# Patient Record
Sex: MALE | Race: WHITE
[De-identification: names, ages, dates, MRNs, and addresses within clinical notes are randomized per-mention and may not be internally consistent; named-entity substitution may affect disease eponyms.]

---

## 2017-02-07 ENCOUNTER — TRANSCRIPTION ENCOUNTER (OUTPATIENT)
Age: 57
End: 2017-02-07

## 2017-03-01 ENCOUNTER — APPOINTMENT (OUTPATIENT)
Dept: ORTHOPEDIC SURGERY | Facility: CLINIC | Age: 57
End: 2017-03-01

## 2017-11-14 ENCOUNTER — RESULT REVIEW (OUTPATIENT)
Age: 57
End: 2017-11-14

## 2019-07-09 ENCOUNTER — APPOINTMENT (OUTPATIENT)
Dept: CARDIOLOGY | Facility: CLINIC | Age: 59
End: 2019-07-09
Payer: COMMERCIAL

## 2019-07-09 ENCOUNTER — NON-APPOINTMENT (OUTPATIENT)
Age: 59
End: 2019-07-09

## 2019-07-09 VITALS
RESPIRATION RATE: 14 BRPM | HEART RATE: 88 BPM | OXYGEN SATURATION: 99 % | BODY MASS INDEX: 29.33 KG/M2 | DIASTOLIC BLOOD PRESSURE: 84 MMHG | WEIGHT: 198 LBS | SYSTOLIC BLOOD PRESSURE: 131 MMHG | HEIGHT: 69 IN

## 2019-07-09 DIAGNOSIS — Z78.9 OTHER SPECIFIED HEALTH STATUS: ICD-10-CM

## 2019-07-09 PROCEDURE — 93000 ELECTROCARDIOGRAM COMPLETE: CPT

## 2019-07-09 PROCEDURE — 99214 OFFICE O/P EST MOD 30 MIN: CPT

## 2019-07-09 NOTE — PHYSICAL EXAM
[General Appearance - Well Developed] : well developed [General Appearance - Well Nourished] : well nourished [Normal Oropharynx] : normal oropharynx [Normal Conjunctiva] : the conjunctiva exhibited no abnormalities [Heart Rate And Rhythm] : heart rate and rhythm were normal [Normal Jugular Venous V Waves Present] : normal jugular venous V waves present [Heart Sounds] : normal S1 and S2 [Murmurs] : no murmurs present [] : no respiratory distress [Respiration, Rhythm And Depth] : normal respiratory rhythm and effort [Auscultation Breath Sounds / Voice Sounds] : lungs were clear to auscultation bilaterally [Bowel Sounds] : normal bowel sounds [Abdomen Soft] : soft [Abdomen Tenderness] : non-tender [Abnormal Walk] : normal gait [Nail Clubbing] : no clubbing of the fingernails [Skin Color & Pigmentation] : normal skin color and pigmentation [Cyanosis, Localized] : no localized cyanosis [Skin Turgor] : normal skin turgor [Affect] : the affect was normal [Oriented To Time, Place, And Person] : oriented to person, place, and time [FreeTextEntry1] : no edema

## 2019-07-09 NOTE — DISCUSSION/SUMMARY
[FreeTextEntry1] : Patient is a 60yo M with HTN here for cardiac evaluation of near syncope. Episodes likely vagal and advised abortive measures. Will arrange cardiac work up as well to include holter, echo and EST. IF all negative, would also cut out HCTZ if episodes continue. Can titrate up ARB if needed. \par \par 1. Echo, holter and EST to evaluate near syncopal episodes\par 2. Continue telmisartan-hct, will consider stopping HCTZ at follow up if work up negative and symptoms continue\par 3. Keep hydrated and discussed abortive measures for vagal episodes\par 4. FOllow up after testing

## 2019-07-09 NOTE — REASON FOR VISIT
[Follow-Up - From Hospitalization] : follow-up of a recent hospitalization for [FreeTextEntry2] : near syncope

## 2019-07-09 NOTE — HISTORY OF PRESENT ILLNESS
[FreeTextEntry1] : Patient is a 60yo M with HTN here for cardiac evaluation of near syncope. Went to [facility] and observed overnight. No events on monitor and ruled out ACS. HAd gone to gym in am, drank water and ate banana. 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 Was standing/talking and felt dizzy/lightheaded. Went to knees and felt better. No CP/SOB, no pnd/orthopnea/palps or true syncope. Continues to get episodes since discharge.  Will feel a rush coming up back of his neck, can last up to an hour. NEeds to sit and relax to abort episodes. HAs happened several times. Has also noted stress at work and with recent diagnosis of cancer in his mother. \par \par ROS: GI negative, all others negative

## 2019-07-09 NOTE — ASSESSMENT
[FreeTextEntry1] : ECG: SR, no significant ST-T abnormalities and normal intervals \par \par  HDL 59  TG 46 (4/2019)

## 2019-07-15 ENCOUNTER — APPOINTMENT (OUTPATIENT)
Dept: CARDIOLOGY | Facility: CLINIC | Age: 59
End: 2019-07-15
Payer: COMMERCIAL

## 2019-07-15 PROCEDURE — 93015 CV STRESS TEST SUPVJ I&R: CPT

## 2019-07-18 ENCOUNTER — APPOINTMENT (OUTPATIENT)
Dept: CARDIOLOGY | Facility: CLINIC | Age: 59
End: 2019-07-18
Payer: COMMERCIAL

## 2019-07-18 PROCEDURE — 93306 TTE W/DOPPLER COMPLETE: CPT

## 2019-07-25 ENCOUNTER — APPOINTMENT (OUTPATIENT)
Dept: CARDIOLOGY | Facility: CLINIC | Age: 59
End: 2019-07-25
Payer: COMMERCIAL

## 2019-07-25 VITALS
HEART RATE: 70 BPM | WEIGHT: 198 LBS | BODY MASS INDEX: 29.33 KG/M2 | HEIGHT: 69 IN | DIASTOLIC BLOOD PRESSURE: 77 MMHG | SYSTOLIC BLOOD PRESSURE: 121 MMHG | RESPIRATION RATE: 16 BRPM | OXYGEN SATURATION: 98 %

## 2019-07-25 DIAGNOSIS — R55 SYNCOPE AND COLLAPSE: ICD-10-CM

## 2019-07-25 PROCEDURE — 99214 OFFICE O/P EST MOD 30 MIN: CPT

## 2019-07-25 RX ORDER — CICLOPIROX OLAMINE 7.7 MG/G
0.77 CREAM TOPICAL
Qty: 30 | Refills: 0 | Status: COMPLETED | COMMUNITY
Start: 2019-02-22

## 2019-07-25 NOTE — DISCUSSION/SUMMARY
[FreeTextEntry1] : Patient is a 58yo M with HTN here for cardiac evaluation of near syncope. Episodes likely vagal and advised abortive measures. EST without ischemia, brief run NSVT and some ectopy but otherwise structurally normal heart on echo. Borderline LVH only, corrects for body size. Holter with rare ectopy only as well. Given brief NSVT will arrange 4 week event monitor but low suspicion will be significant. \par \par 1. 4 week event monitor to evaluate NSVT and PVC , call with results\par 2. Keep hydrated and discussed abortive measures for vagal episodes\par 3. DC HCTZ, continue telmisartan and can titrate up if needed for HTN\par 4. .lifestyle \par 5. Recommend 30 minutes moderate intensity aerobic activity 5 days per week \par 6. CAC for further risk stratification, call with results\par 7. Follow up 6 months

## 2019-07-25 NOTE — HISTORY OF PRESENT ILLNESS
[FreeTextEntry1] : Patient is a 60yo M with HTN here for cardiac evaluation of near syncope. Went to [facility] and observed overnight. No events on monitor and ruled out ACS. HAd gone to gym in am, drank water and ate banana. 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 Was standing/talking and felt dizzy/lightheaded. Went to knees and felt better. No CP/SOB, no pnd/orthopnea/palps or true syncope. Continues to get episodes since discharge.  Will feel a rush coming up back of his neck, can last up to an hour. NEeds to sit and relax to abort episodes. HAs happened several times. Has also noted stress at work and with recent diagnosis of cancer in his mother. FEeling better, one recurrent episode last friday. \par \par Patient underwent cardiac testing after last visit. \par \par ROS: GI and  negative

## 2019-07-25 NOTE — ASSESSMENT
[FreeTextEntry1] : \par \par  HDL 59  TG 46 (4/2019) \par \par HOLTER 7/2019:\par 1. SR\par 2. 3 PVCs and 1 PAC\par 3. No events\par \par ECHO 7/2019:\par Summary:\par 1. Left ventricular ejection fraction, by visual estimation, is 60 to 65%.\par 2. Normal global left ventricular systolic function.\par 3. Equivocal LV diastolic parameters.\par 4. Normal left ventricular internal cavity size.\par 5. Mild concentric left ventricular hypertrophy.\par 6. Prominent septal knuckle noted without LVOT obstruction.\par 7. Normal right ventricular size and systolic function.\par 8. Mildly dilated left atrium.\par 9. The right atrium is normal in size and structure.\par 10. Mild mitral annular calcification.\par 11. Trace mitral valve regurgitation.\par 12. Normal aortic valve, without any evidence of aortic stenosis or insufficiency.\par 13. There is no evidence of pericardial effusion.\par 14. TR jet was inadequate for evaluation of RV/PA pressure.\par 15. Recommend clinical correlation with the above findings.\par \par EST 7/2019:\par 1. Negative exercise stress test for ischemia.\par 2. The patient developed occasional PVCs, ventricular couplets, 3 beat ventricular run and\par occasional PACs during exercise.\par 3. The blood pressure response was normal.\par 4. The patient developed leg fatigue during the stress exam. The symptoms resolved with rest.\par 5. The test was terminated due to leg fatigue.\par 6. The patient's functional capacity was above average.\par 7. The Duke Treadmill Score was 10, consistent with low risk.\par 8. Recommend clinical correlation with the above findings.

## 2019-08-09 ENCOUNTER — APPOINTMENT (OUTPATIENT)
Dept: CARDIOLOGY | Facility: CLINIC | Age: 59
End: 2019-08-09

## 2020-01-08 ENCOUNTER — APPOINTMENT (OUTPATIENT)
Dept: CARDIOLOGY | Facility: CLINIC | Age: 60
End: 2020-01-08
Payer: COMMERCIAL

## 2020-01-08 ENCOUNTER — NON-APPOINTMENT (OUTPATIENT)
Age: 60
End: 2020-01-08

## 2020-01-08 VITALS
DIASTOLIC BLOOD PRESSURE: 85 MMHG | HEART RATE: 79 BPM | RESPIRATION RATE: 16 BRPM | HEIGHT: 69 IN | BODY MASS INDEX: 29.92 KG/M2 | WEIGHT: 202 LBS | SYSTOLIC BLOOD PRESSURE: 135 MMHG

## 2020-01-08 PROCEDURE — 99214 OFFICE O/P EST MOD 30 MIN: CPT

## 2020-01-08 PROCEDURE — 93000 ELECTROCARDIOGRAM COMPLETE: CPT

## 2020-01-08 RX ORDER — TELMISARTAN 40 MG/1
40 TABLET ORAL
Refills: 0 | Status: ACTIVE | COMMUNITY

## 2020-01-08 RX ORDER — TELMISARTAN AND HYDROCHLOROTHIAZIDE 40; 12.5 MG/1; MG/1
40-12.5 TABLET ORAL DAILY
Refills: 0 | Status: DISCONTINUED | COMMUNITY
End: 2020-01-08

## 2020-01-08 NOTE — HISTORY OF PRESENT ILLNESS
[FreeTextEntry1] : Patient is a 60yo M with HTN, CAC here for follow up. As part of work up for near syncope had cardiac work up and ultimately found to have moderate non-obstructive CAD based on CT.  Put on statin. Feeling well without CP/SOB. Patient denies PND/orthopnea/edema/palpitations/syncope/claudication. RAre occasional dizzines but mild. IN mornings mild muscle cramping in neck and shoulders. \par \par \par \par ROS: GI and  negative

## 2020-01-08 NOTE — DISCUSSION/SUMMARY
[FreeTextEntry1] : Patient is a 58yo M with HTN here for cardiac evaluation of near syncope. Episodes likely vagal and advised abortive measures. EST without ischemia, brief run NSVT and some ectopy but otherwise structurally normal heart on echo. Borderline LVH only, corrects for body size. No events on event monitor in the fall. Has moderate non-obstructive CAD with CAC = 133. Med management. \par \par 1. Continue medical management of CAD \par 2. Continue current antihypertensives, monitod bid at home and call with results in 2 weeks. Suspect component white coat HTN given dizziness on higher doses in past\par 3. Recommend aggressive diet and lifestyle modifications \par 4. Recommend 30 minutes moderate intensity aerobic activity 5 days per week \par 5. Trial of CoQ 100mg prior to statin for side effects. ALso needs dietary changes to get numbers down further\par 6. Restart ASA 81mg daily due to moderate CAC\par 7. Followup 6 months

## 2020-01-08 NOTE — ASSESSMENT
[FreeTextEntry1] : ECG: SR, no significant ST-T abnormalities and normal intervals \par \par  HDL 73  TG 64 (1/2020) \par  HDL 59  TG 46 (4/2019) \par \par CAC 9/2019:\par 1. Total calcium score = 133\par 2. LM = 0, LAD = 60, LCx = 73, RCA = 0\par \par Event Monitor 4 week (9/2019)\par 1. SR, no events\par \par HOLTER 7/2019:\par 1. SR\par 2. 3 PVCs and 1 PAC\par 3. No events\par \par ECHO 7/2019:\par Summary:\par 1. Left ventricular ejection fraction, by visual estimation, is 60 to 65%.\par 2. Normal global left ventricular systolic function.\par 3. Equivocal LV diastolic parameters.\par 4. Normal left ventricular internal cavity size.\par 5. Mild concentric left ventricular hypertrophy.\par 6. Prominent septal knuckle noted without LVOT obstruction.\par 7. Normal right ventricular size and systolic function.\par 8. Mildly dilated left atrium.\par 9. The right atrium is normal in size and structure.\par 10. Mild mitral annular calcification.\par 11. Trace mitral valve regurgitation.\par 12. Normal aortic valve, without any evidence of aortic stenosis or insufficiency.\par 13. There is no evidence of pericardial effusion.\par 14. TR jet was inadequate for evaluation of RV/PA pressure.\par 15. Recommend clinical correlation with the above findings.\par \par EST 7/2019:\par 1. Negative exercise stress test for ischemia.\par 2. The patient developed occasional PVCs, ventricular couplets, 3 beat ventricular run and\par occasional PACs during exercise.\par 3. The blood pressure response was normal.\par 4. The patient developed leg fatigue during the stress exam. The symptoms resolved with rest.\par 5. The test was terminated due to leg fatigue.\par 6. The patient's functional capacity was above average.\par 7. The Duke Treadmill Score was 10, consistent with low risk.\par 8. Recommend clinical correlation with the above findings.

## 2020-07-09 ENCOUNTER — APPOINTMENT (OUTPATIENT)
Dept: CARDIOLOGY | Facility: CLINIC | Age: 60
End: 2020-07-09
Payer: COMMERCIAL

## 2020-07-09 VITALS
HEIGHT: 69 IN | HEART RATE: 85 BPM | DIASTOLIC BLOOD PRESSURE: 80 MMHG | WEIGHT: 201 LBS | OXYGEN SATURATION: 98 % | SYSTOLIC BLOOD PRESSURE: 130 MMHG | TEMPERATURE: 98.5 F | RESPIRATION RATE: 16 BRPM | BODY MASS INDEX: 29.77 KG/M2

## 2020-07-09 DIAGNOSIS — Z00.00 ENCOUNTER FOR GENERAL ADULT MEDICAL EXAMINATION W/OUT ABNORMAL FINDINGS: ICD-10-CM

## 2020-07-09 DIAGNOSIS — R42 DIZZINESS AND GIDDINESS: ICD-10-CM

## 2020-07-09 PROCEDURE — 93000 ELECTROCARDIOGRAM COMPLETE: CPT

## 2020-07-09 PROCEDURE — 99214 OFFICE O/P EST MOD 30 MIN: CPT

## 2020-07-09 NOTE — HISTORY OF PRESENT ILLNESS
[FreeTextEntry1] : Patient is a 59yo M with HTN, CAC here for follow up. As part of work up for near syncope had cardiac work up and ultimately found to have moderate non-obstructive CAD based on CT.  Put on statin. Feeling well without CP/SOB. Patient denies PND/orthopnea/edema/palpitations/syncope/claudication.  Had mild symptoms from COVID couple months back, had fatigue and mild chest discomfort now better. \par \par Having intermittent dizziness again. No clear aggravating factors. NOt exertional, can be at rest. NOt necessarily positional. LAsts 10-15minutes at times. REcently past few days with pain in chest, abdomen and arms. One night felt like reflux and up to throat. STarted prilosec and now better past 1-2 days. BP at home running 120s. \par \par \par ROS: GI and  negative

## 2020-07-09 NOTE — PHYSICAL EXAM
[General Appearance - Well Developed] : well developed [General Appearance - Well Nourished] : well nourished [Normal Conjunctiva] : the conjunctiva exhibited no abnormalities [Normal Oropharynx] : normal oropharynx [] : no respiratory distress [Normal Jugular Venous V Waves Present] : normal jugular venous V waves present [Respiration, Rhythm And Depth] : normal respiratory rhythm and effort [Heart Rate And Rhythm] : heart rate and rhythm were normal [Auscultation Breath Sounds / Voice Sounds] : lungs were clear to auscultation bilaterally [Heart Sounds] : normal S1 and S2 [Murmurs] : no murmurs present [Edema] : no peripheral edema present [Bowel Sounds] : normal bowel sounds [Abdomen Soft] : soft [Abdomen Tenderness] : non-tender [Abnormal Walk] : normal gait [Nail Clubbing] : no clubbing of the fingernails [Cyanosis, Localized] : no localized cyanosis [Skin Color & Pigmentation] : normal skin color and pigmentation [Skin Turgor] : normal skin turgor [Oriented To Time, Place, And Person] : oriented to person, place, and time [Affect] : the affect was normal

## 2020-07-09 NOTE — ASSESSMENT
[FreeTextEntry1] : \par \par  HDL 73  TG 64 (1/2020) \par  HDL 59  TG 46 (4/2019) \par \par CAC 9/2019:\par 1. Total calcium score = 133\par 2. LM = 0, LAD = 60, LCx = 73, RCA = 0\par \par Event Monitor 4 week (9/2019)\par 1. SR, no events\par \par HOLTER 7/2019:\par 1. SR\par 2. 3 PVCs and 1 PAC\par 3. No events\par \par ECHO 7/2019:\par Summary:\par 1. Left ventricular ejection fraction, by visual estimation, is 60 to 65%.\par 2. Normal global left ventricular systolic function.\par 3. Equivocal LV diastolic parameters.\par 4. Normal left ventricular internal cavity size.\par 5. Mild concentric left ventricular hypertrophy.\par 6. Prominent septal knuckle noted without LVOT obstruction.\par 7. Normal RV/RA\par 8. Mildly dilated left atrium.\par 9. Trivial MR\par 10. TR jet was inadequate for evaluation of RV/PA pressure.\par \par \par EST 7/2019:\par 1. Negative exercise stress test for ischemia.\par 2. The patient developed occasional PVCs, ventricular couplets, 3 beat ventricular run and PACs\par 3. The blood pressure response was normal.\par 4. The Duke Treadmill Score was 10, consistent with low risk.\par

## 2020-07-09 NOTE — DISCUSSION/SUMMARY
[FreeTextEntry1] : Patient is a 61yo M with HTN, non-obstructive CAD (CAC = 133), PVCs, brief episodes PSVT  here for cardiac follow up. Doing well from CV standpoint. Dizzy/lightheadedness non-cardiac. REcommend restarting ASA. \par \par 1. Continue medical management of CAD , recommend restart ASA\par 2. Continue current antihypertensives, BP controlled\par 3. Dizziness non-cardiac, ? vestibular. trial prn meclizine and follow up with PMD\par 4. Recommend aggressive diet and lifestyle modifications \par 5. Recommend 30 minutes moderate intensity aerobic activity 5 days per week

## 2020-10-20 RX ORDER — ROSUVASTATIN CALCIUM 10 MG/1
10 TABLET, FILM COATED ORAL
Qty: 90 | Refills: 0 | Status: ACTIVE | COMMUNITY
Start: 2019-10-07 | End: 1900-01-01

## 2020-11-03 ENCOUNTER — NON-APPOINTMENT (OUTPATIENT)
Age: 60
End: 2020-11-03

## 2020-11-03 ENCOUNTER — APPOINTMENT (OUTPATIENT)
Dept: CARDIOLOGY | Facility: CLINIC | Age: 60
End: 2020-11-03
Payer: COMMERCIAL

## 2020-11-03 VITALS
WEIGHT: 207 LBS | SYSTOLIC BLOOD PRESSURE: 155 MMHG | HEART RATE: 67 BPM | TEMPERATURE: 98.7 F | DIASTOLIC BLOOD PRESSURE: 82 MMHG | BODY MASS INDEX: 30.66 KG/M2 | RESPIRATION RATE: 16 BRPM | HEIGHT: 69 IN

## 2020-11-03 PROBLEM — Z00.00 ENCOUNTER FOR PREVENTIVE HEALTH EXAMINATION: Status: ACTIVE | Noted: 2017-02-07

## 2020-11-03 PROCEDURE — 93000 ELECTROCARDIOGRAM COMPLETE: CPT

## 2020-11-03 PROCEDURE — 99214 OFFICE O/P EST MOD 30 MIN: CPT

## 2020-11-03 PROCEDURE — 99072 ADDL SUPL MATRL&STAF TM PHE: CPT

## 2020-11-03 NOTE — PHYSICAL EXAM
[General Appearance - Well Developed] : well developed [General Appearance - Well Nourished] : well nourished [Normal Conjunctiva] : the conjunctiva exhibited no abnormalities [Normal Oropharynx] : normal oropharynx [Normal Jugular Venous V Waves Present] : normal jugular venous V waves present [] : no respiratory distress [Respiration, Rhythm And Depth] : normal respiratory rhythm and effort [Auscultation Breath Sounds / Voice Sounds] : lungs were clear to auscultation bilaterally [Heart Rate And Rhythm] : heart rate and rhythm were normal [Heart Sounds] : normal S1 and S2 [Murmurs] : no murmurs present [Edema] : no peripheral edema present [Bowel Sounds] : normal bowel sounds [Abdomen Soft] : soft [Abdomen Tenderness] : non-tender [Abnormal Walk] : normal gait [Nail Clubbing] : no clubbing of the fingernails [Cyanosis, Localized] : no localized cyanosis [Skin Color & Pigmentation] : normal skin color and pigmentation [Skin Turgor] : normal skin turgor [Oriented To Time, Place, And Person] : oriented to person, place, and time [Affect] : the affect was normal

## 2020-11-03 NOTE — DISCUSSION/SUMMARY
[FreeTextEntry1] : Patient is a 61yo M with HTN, non-obstructive CAD (CAC = 133), PVCs, brief episodes PSVT  here for cardiac follow up. Doing well from CV standpoint. Dizzy/lightheadedness non-cardiac, ? anxiety. REcommend restarting ASA again, taking statin more regularly now.  \par \par 1. Continue medical management of non-obstructive CAD , recommend restart ASA\par 2. Continue current antihypertensives, BP controlled\par 3. Dizziness non-cardiac, follow up pmd\par 4. Recommend aggressive diet and lifestyle modifications \par 5. Recommend 30 minutes moderate intensity aerobic activity 5 days per week \par 6. Follow up 6 months, echo and nuclear stress testing then to evaluate his CAD

## 2020-11-03 NOTE — ASSESSMENT
[FreeTextEntry1] : ECG: SR, no significant ST-T abnormalities and normal intervals \par \par  HDL 73  TG 64 (1/2020) \par  HDL 59  TG 46 (4/2019) \par \par CAC 9/2019:\par 1. Total calcium score = 133\par 2. LM = 0, LAD = 60, LCx = 73, RCA = 0\par \par Event Monitor 4 week (9/2019)\par 1. SR, no events\par \par HOLTER 7/2019:\par 1. SR\par 2. 3 PVCs and 1 PAC\par 3. No events\par \par ECHO 7/2019:\par Summary:\par 1. Left ventricular ejection fraction, by visual estimation, is 60 to 65%.\par 2. Normal global left ventricular systolic function.\par 3. Equivocal LV diastolic parameters.\par 4. Normal left ventricular internal cavity size.\par 5. Mild concentric left ventricular hypertrophy.\par 6. Prominent septal knuckle noted without LVOT obstruction.\par 7. Normal RV/RA\par 8. Mildly dilated left atrium.\par 9. Trivial MR\par 10. TR jet was inadequate for evaluation of RV/PA pressure.\par \par \par EST 7/2019:\par 1. Negative exercise stress test for ischemia.\par 2. The patient developed occasional PVCs, ventricular couplets, 3 beat ventricular run and PACs\par 3. The blood pressure response was normal.\par 4. The Duke Treadmill Score was 10, consistent with low risk.\par

## 2020-11-03 NOTE — HISTORY OF PRESENT ILLNESS
[FreeTextEntry1] : Patient is a 59yo M with HTN, CAC here for follow up. As part of work up for near syncope had cardiac work up and ultimately found to have moderate non-obstructive CAD based on CT.  Put on statin. Feeling well without CP/SOB. Patient denies PND/orthopnea/edema/palpitations/syncope/claudication.  Had mild symptoms from COVID earlier this year but now totally recovered. Still with dizziness, better over the summer, little worse now. \par \par ROS: GI and  negative

## 2021-04-06 ENCOUNTER — APPOINTMENT (OUTPATIENT)
Dept: CARDIOLOGY | Facility: CLINIC | Age: 61
End: 2021-04-06
Payer: COMMERCIAL

## 2021-04-06 PROCEDURE — 93015 CV STRESS TEST SUPVJ I&R: CPT

## 2021-04-06 PROCEDURE — 78452 HT MUSCLE IMAGE SPECT MULT: CPT

## 2021-04-06 PROCEDURE — 99072 ADDL SUPL MATRL&STAF TM PHE: CPT

## 2021-04-06 PROCEDURE — A9500: CPT

## 2021-04-14 ENCOUNTER — APPOINTMENT (OUTPATIENT)
Dept: CARDIOLOGY | Facility: CLINIC | Age: 61
End: 2021-04-14
Payer: COMMERCIAL

## 2021-04-14 PROCEDURE — 93306 TTE W/DOPPLER COMPLETE: CPT

## 2021-04-14 PROCEDURE — 99072 ADDL SUPL MATRL&STAF TM PHE: CPT

## 2021-04-14 RX ADMIN — PERFLUTREN MG/ML: 6.52 INJECTION, SUSPENSION INTRAVENOUS at 00:00

## 2021-04-15 VITALS — DIASTOLIC BLOOD PRESSURE: 80 MMHG | SYSTOLIC BLOOD PRESSURE: 161 MMHG

## 2021-04-15 RX ORDER — KIT FOR THE PREPARATION OF TECHNETIUM TC99M SESTAMIBI 1 MG/5ML
INJECTION, POWDER, LYOPHILIZED, FOR SOLUTION PARENTERAL
Refills: 0 | Status: COMPLETED | OUTPATIENT
Start: 2021-04-15

## 2021-04-15 RX ORDER — PERFLUTREN 6.52 MG/ML
6.52 INJECTION, SUSPENSION INTRAVENOUS
Qty: 2 | Refills: 0 | Status: COMPLETED | OUTPATIENT
Start: 2021-04-14

## 2021-04-15 RX ADMIN — KIT FOR THE PREPARATION OF TECHNETIUM TC99M SESTAMIBI 0: 1 INJECTION, POWDER, LYOPHILIZED, FOR SOLUTION PARENTERAL at 00:00

## 2021-04-16 ENCOUNTER — APPOINTMENT (OUTPATIENT)
Dept: CARDIOLOGY | Facility: CLINIC | Age: 61
End: 2021-04-16
Payer: COMMERCIAL

## 2021-04-16 VITALS
HEIGHT: 69 IN | HEART RATE: 80 BPM | BODY MASS INDEX: 30.07 KG/M2 | WEIGHT: 203 LBS | DIASTOLIC BLOOD PRESSURE: 84 MMHG | TEMPERATURE: 97.9 F | RESPIRATION RATE: 16 BRPM | SYSTOLIC BLOOD PRESSURE: 153 MMHG

## 2021-04-16 DIAGNOSIS — I10 ESSENTIAL (PRIMARY) HYPERTENSION: ICD-10-CM

## 2021-04-16 DIAGNOSIS — I49.3 VENTRICULAR PREMATURE DEPOLARIZATION: ICD-10-CM

## 2021-04-16 DIAGNOSIS — I25.10 ATHEROSCLEROTIC HEART DISEASE OF NATIVE CORONARY ARTERY W/OUT ANGINA PECTORIS: ICD-10-CM

## 2021-04-16 DIAGNOSIS — I25.84 ATHEROSCLEROTIC HEART DISEASE OF NATIVE CORONARY ARTERY W/OUT ANGINA PECTORIS: ICD-10-CM

## 2021-04-16 PROCEDURE — 99072 ADDL SUPL MATRL&STAF TM PHE: CPT

## 2021-04-16 PROCEDURE — 99214 OFFICE O/P EST MOD 30 MIN: CPT

## 2021-04-16 NOTE — DISCUSSION/SUMMARY
[FreeTextEntry1] : Patient is a 59yo M with HTN, non-obstructive CAD (CAC = 133), PVCs, brief episodes PSVT  here for cardiac follow up. Doing well from CV standpoint. \par -Negative exercise nuclear stress test 4/2021, good functional capacity\par -echo 4/2021 [facility] \par \par 1. Continue medical management of non-obstructive CAD , recommend restart ASA\par 2. Continue current antihypertensives, BP controlled. Has white coat HTN\par 3. Dizziness non-cardiac, follow up pmd and has neuro eval later this month\par 4. Recommend aggressive diet and lifestyle modifications \par 5. Recommend 30 minutes moderate intensity aerobic activity 5 days per week \par 6. Follow up 1 year

## 2021-04-16 NOTE — PHYSICAL EXAM
[General Appearance - Well Developed] : well developed [General Appearance - Well Nourished] : well nourished [Normal Conjunctiva] : the conjunctiva exhibited no abnormalities [Normal Oropharynx] : normal oropharynx [Normal Jugular Venous V Waves Present] : normal jugular venous V waves present [] : no respiratory distress [Respiration, Rhythm And Depth] : normal respiratory rhythm and effort [Auscultation Breath Sounds / Voice Sounds] : lungs were clear to auscultation bilaterally [Heart Rate And Rhythm] : heart rate and rhythm were normal [Heart Sounds] : normal S1 and S2 [Murmurs] : no murmurs present [Edema] : no peripheral edema present [Bowel Sounds] : normal bowel sounds [Abdomen Soft] : soft [Abnormal Walk] : normal gait [Abdomen Tenderness] : non-tender [Nail Clubbing] : no clubbing of the fingernails [Cyanosis, Localized] : no localized cyanosis [Skin Color & Pigmentation] : normal skin color and pigmentation [Skin Turgor] : normal skin turgor [Oriented To Time, Place, And Person] : oriented to person, place, and time [Affect] : the affect was normal

## 2021-04-16 NOTE — ASSESSMENT
[FreeTextEntry1] : ECG: SR, no significant ST-T abnormalities and normal intervals \par \par  HDL 73  TG 64 (1/2020) \par  HDL 59  TG 46 (4/2019) \par \par ECHO 4/2021:\par 1. Normal LV size, systolic and diastolic function. EF 60-65%\par 2. Normal RV/LA/RA \par 3. No clinically significant valvular disease \par \par EXERCISE NUCLEAR STRESS TEST 4/2021:\par 1. Negative for ischemia/infarct, EF 70%\par 2. Normal BP response\par 3. DTS = 9\par \par CAC 9/2019:\par 1. Total calcium score = 133\par 2. LM = 0, LAD = 60, LCx = 73, RCA = 0\par \par Event Monitor 4 week (9/2019)\par 1. SR, no events\par \par HOLTER 7/2019:\par 1. SR\par 2. 3 PVCs and 1 PAC\par 3. No events\par \par ECHO 7/2019:\par Summary:\par 1. Left ventricular ejection fraction, by visual estimation, is 60 to 65%.\par 2. Normal global left ventricular systolic function.\par 3. Equivocal LV diastolic parameters.\par 4. Normal left ventricular internal cavity size.\par 5. Mild concentric left ventricular hypertrophy.\par 6. Prominent septal knuckle noted without LVOT obstruction.\par 7. Normal RV/RA\par 8. Mildly dilated left atrium.\par 9. Trivial MR\par 10. TR jet was inadequate for evaluation of RV/PA pressure.\par \par \par EST 7/2019:\par 1. Negative exercise stress test for ischemia.\par 2. The patient developed occasional PVCs, ventricular couplets, 3 beat ventricular run and PACs\par 3. The blood pressure response was normal.\par 4. The Duke Treadmill Score was 10, consistent with low risk.\par

## 2021-04-16 NOTE — HISTORY OF PRESENT ILLNESS
[FreeTextEntry1] : Patient is a 60yo M with HTN, CAC here for follow up. As part of work up for near syncope had cardiac work up and ultimately found to have moderate non-obstructive CAD based on CT.  Put on statin. Feeling well without CP/SOB. Patient denies PND/orthopnea/edema/palpitations/syncope/claudication.  Had mild symptoms from COVID last year but now totally recovered. Patient underwent cardiac testing after last visit. Still with intermittent dizziness. Gets neck pains/headaches. A bit better however. LAst home BP was 120s systolic. Got 2nd dose COVID vaccine 2 weeks ago. \par \par ROS: GI and  negative

## 2023-04-08 ENCOUNTER — APPOINTMENT (OUTPATIENT)
Dept: ORTHOPEDIC SURGERY | Facility: CLINIC | Age: 63
End: 2023-04-08
Payer: COMMERCIAL

## 2023-04-08 VITALS — WEIGHT: 203 LBS | BODY MASS INDEX: 30.07 KG/M2 | HEIGHT: 69 IN

## 2023-04-08 DIAGNOSIS — Z78.9 OTHER SPECIFIED HEALTH STATUS: ICD-10-CM

## 2023-04-08 DIAGNOSIS — S39.012A STRAIN OF MUSCLE, FASCIA AND TENDON OF LOWER BACK, INITIAL ENCOUNTER: ICD-10-CM

## 2023-04-08 PROCEDURE — 72100 X-RAY EXAM L-S SPINE 2/3 VWS: CPT

## 2023-04-08 PROCEDURE — 73503 X-RAY EXAM HIP UNI 4/> VIEWS: CPT | Mod: LT

## 2023-04-08 PROCEDURE — 99204 OFFICE O/P NEW MOD 45 MIN: CPT

## 2023-04-08 RX ORDER — DICLOFENAC SODIUM 75 MG/1
75 TABLET, DELAYED RELEASE ORAL
Qty: 30 | Refills: 1 | Status: ACTIVE | COMMUNITY
Start: 2023-04-08 | End: 1900-01-01

## 2023-04-08 RX ORDER — TIZANIDINE 4 MG/1
4 TABLET ORAL 3 TIMES DAILY
Qty: 90 | Refills: 0 | Status: ACTIVE | COMMUNITY
Start: 2023-04-08 | End: 1900-01-01

## 2023-04-08 RX ORDER — ALPRAZOLAM 0.5 MG/1
0.5 TABLET ORAL
Refills: 0 | Status: COMPLETED | COMMUNITY
End: 2023-04-08

## 2023-04-08 NOTE — IMAGING
[Disc space narrowing] : Disc space narrowing [AP] : anteroposterior [Left] : left hip with pelvis [Lateral] : lateral [There are no fractures, subluxations or dislocations. No significant abnormalities are seen] : There are no fractures, subluxations or dislocations. No significant abnormalities are seen [FreeTextEntry1] : multilevel DJD

## 2023-04-08 NOTE — HISTORY OF PRESENT ILLNESS
[Lower back] : lower back [Sudden] : sudden [8] : 8 [5] : 5 [Radiating] : radiating [Sharp] : sharp [Tightness] : tightness [Constant] : constant [Household chores] : household chores [Sitting] : sitting [Exercising] : exercising [Full time] : Work status: full time [de-identified] : 62 y/o male c/o low back pain that started on 4/5/23. No specific injury, but had just returned from [place] where he was doing walking than his usual. Describes left sided low back pain without radiation. Stiffness after rest. He has been using TENS unit and taking Advil with little relief. Denies history of prior injury.\par \par Occupation: owns auto shop [FreeTextEntry3] : 04/05/23 [] : no [FreeTextEntry5] : Patient reported acute insidious low back and left hip pain after returning from vacation in [place].  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

## 2023-04-08 NOTE — PHYSICAL EXAM
[Flexion] : flexion [Extension] : extension [5___] : right hamstring 5[unfilled]/5 [] : patient ambulates without assistive device

## 2023-04-08 NOTE — DISCUSSION/SUMMARY
[de-identified] : The patient was advised of the diagnosis. The natural history of the pathology was explained in full to the patient in layman's terms. All questions were answered. The risks and benefits of surgical and non-surgical treatment alternatives were explained in full to the patient.\par \par I discussed timing and frequency of the medication with the patient.\par \par Recommend PT.\par \par I explained to the patient that ice, stretching, TENS unit, and rest would benefit them. They may return to activity after follow-up or when they no longer have any pain.\par

## 2023-04-26 ENCOUNTER — APPOINTMENT (OUTPATIENT)
Dept: ORTHOPEDIC SURGERY | Facility: CLINIC | Age: 63
End: 2023-04-26